# Patient Record
Sex: MALE | Race: BLACK OR AFRICAN AMERICAN
[De-identification: names, ages, dates, MRNs, and addresses within clinical notes are randomized per-mention and may not be internally consistent; named-entity substitution may affect disease eponyms.]

---

## 2017-09-07 ENCOUNTER — HOSPITAL ENCOUNTER (EMERGENCY)
Dept: HOSPITAL 63 - ER | Age: 72
Discharge: HOME | End: 2017-09-07
Payer: COMMERCIAL

## 2017-09-07 VITALS — SYSTOLIC BLOOD PRESSURE: 128 MMHG | DIASTOLIC BLOOD PRESSURE: 80 MMHG
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
## 2017-09-07 DIAGNOSIS — M62.830: ICD-10-CM

## 2017-09-07 DIAGNOSIS — I10: ICD-10-CM

## 2017-09-07 DIAGNOSIS — M25.70: ICD-10-CM
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
## 2017-09-07 DIAGNOSIS — S16.1XXA: Primary | ICD-10-CM

## 2017-09-07 DIAGNOSIS — M19.90: ICD-10-CM
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
## 2017-09-07 PROCEDURE — 99284 EMERGENCY DEPT VISIT MOD MDM: CPT

## 2017-09-07 PROCEDURE — 72125 CT NECK SPINE W/O DYE: CPT

## 2017-09-07 PROCEDURE — 72131 CT LUMBAR SPINE W/O DYE: CPT

## 2017-09-07 PROCEDURE — 96372 THER/PROPH/DIAG INJ SC/IM: CPT
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
## 2020-10-04 ENCOUNTER — HOSPITAL ENCOUNTER (EMERGENCY)
Dept: HOSPITAL 63 - ER | Age: 75
Discharge: HOME | End: 2020-10-04
Payer: COMMERCIAL

## 2020-10-04 VITALS
DIASTOLIC BLOOD PRESSURE: 77 MMHG | SYSTOLIC BLOOD PRESSURE: 158 MMHG | SYSTOLIC BLOOD PRESSURE: 158 MMHG | DIASTOLIC BLOOD PRESSURE: 77 MMHG

## 2020-10-04 VITALS — HEIGHT: 72 IN | BODY MASS INDEX: 21.5 KG/M2 | WEIGHT: 158.73 LBS
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
## 2020-10-04 DIAGNOSIS — S09.90XA: Primary | ICD-10-CM

## 2020-10-04 DIAGNOSIS — I10: ICD-10-CM
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
## 2020-10-04 DIAGNOSIS — M19.90: ICD-10-CM

## 2020-10-04 DIAGNOSIS — H11.31: ICD-10-CM

## 2020-10-04 DIAGNOSIS — M25.552: ICD-10-CM
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
## 2020-10-04 PROCEDURE — 99285 EMERGENCY DEPT VISIT HI MDM: CPT

## 2020-10-04 PROCEDURE — 73502 X-RAY EXAM HIP UNI 2-3 VIEWS: CPT

## 2020-10-04 PROCEDURE — 72125 CT NECK SPINE W/O DYE: CPT

## 2020-10-04 PROCEDURE — 70450 CT HEAD/BRAIN W/O DYE: CPT
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
## 2020-11-25 ENCOUNTER — HOSPITAL ENCOUNTER (OUTPATIENT)
Dept: HOSPITAL 63 - LAB | Age: 75
End: 2020-11-25
Attending: NURSE ANESTHETIST, CERTIFIED REGISTERED
Payer: COMMERCIAL

## 2020-11-25 DIAGNOSIS — Z20.828: ICD-10-CM

## 2020-11-25 DIAGNOSIS — H26.9: ICD-10-CM

## 2020-11-25 DIAGNOSIS — Z01.818: Primary | ICD-10-CM

## 2020-11-25 PROCEDURE — U0003 INFECTIOUS AGENT DETECTION BY NUCLEIC ACID (DNA OR RNA); SEVERE ACUTE RESPIRATORY SYNDROME CORONAVIRUS 2 (SARS-COV-2) (CORONAVIRUS DISEASE [COVID-19]), AMPLIFIED PROBE TECHNIQUE, MAKING USE OF HIGH THROUGHPUT TECHNOLOGIES AS DESCRIBED BY CMS-2020-01-R: HCPCS

## 2020-12-01 ENCOUNTER — HOSPITAL ENCOUNTER (OUTPATIENT)
Dept: HOSPITAL 63 - SURG | Age: 75
Discharge: HOME | End: 2020-12-01
Attending: OPHTHALMOLOGY
Payer: COMMERCIAL

## 2020-12-01 VITALS
SYSTOLIC BLOOD PRESSURE: 155 MMHG | DIASTOLIC BLOOD PRESSURE: 98 MMHG | SYSTOLIC BLOOD PRESSURE: 155 MMHG | DIASTOLIC BLOOD PRESSURE: 98 MMHG

## 2020-12-01 DIAGNOSIS — M06.9: ICD-10-CM

## 2020-12-01 DIAGNOSIS — F32.9: ICD-10-CM

## 2020-12-01 DIAGNOSIS — Z79.899: ICD-10-CM

## 2020-12-01 DIAGNOSIS — H11.31: ICD-10-CM

## 2020-12-01 DIAGNOSIS — J44.9: ICD-10-CM

## 2020-12-01 DIAGNOSIS — K21.9: ICD-10-CM

## 2020-12-01 DIAGNOSIS — H25.12: Primary | ICD-10-CM

## 2020-12-01 DIAGNOSIS — I10: ICD-10-CM

## 2020-12-01 DIAGNOSIS — H40.9: ICD-10-CM

## 2020-12-01 PROCEDURE — 66984 XCAPSL CTRC RMVL W/O ECP: CPT

## 2020-12-01 PROCEDURE — 65820 GONIOTOMY: CPT

## 2020-12-01 PROCEDURE — V2632 POST CHMBR INTRAOCULAR LENS: HCPCS

## 2020-12-01 RX ADMIN — MOXIFLOXACIN HYDROCHLORIDE SCH DROP: 5 SOLUTION/ DROPS OPHTHALMIC at 08:11

## 2020-12-01 RX ADMIN — MOXIFLOXACIN HYDROCHLORIDE SCH DROP: 5 SOLUTION/ DROPS OPHTHALMIC at 08:20

## 2020-12-01 RX ADMIN — MOXIFLOXACIN HYDROCHLORIDE SCH DROP: 5 SOLUTION/ DROPS OPHTHALMIC at 08:25

## 2020-12-01 NOTE — PDOC4
Phaco IOL/HDK/OS


Date of Procedure:


Dec 1, 2020





Preoperative Diagnosis:


1. Senile Cataract, Left Eye


2. Glaucoma, Left Eye





Postoperative Diagnosis:


1. Senile Cataract, Left Eye


2. Glaucoma, Left Eye





Anesthesia:


Local (Block) with monitored anesthesia care





Surgeon:


Stone Salazar D.O.





Procedure:


1. Left Phacoemulsification with Intraocular Lens Implant


2. HDK Goniotomy, Left Eye





Findings:


1. Senile Cataract 


2. Glaucoma





Indications:


Worsening vision interfering with patient's lifestyle with mild to moderate 

glaucoma on pressure lowering medication.





Narrative:


After discussing the risks, complications and alternatives, including but not 

limited to loss of vision, infection, bleeding, swelling, anesthetic reaction, 

capsule rupture with vitreous loss, etc., the patient was given a peribulbar 

block under mild IV sedation and cardiac monitoring. Pressure was applied to the

eye for approximately 10 minutes. The patient was transferred to the main 

operating room and prepped and draped in the usual sterile fashion and 

positioned under the microscope. A lid speculum was placed. A side port incision

was made. A 2.4 mm clear corneal incision was made.  Viscoelastic was injected 

into the eye.  A continuous tear capsulorrhexis was performed, then 

hydrodissection was accomplished with balanced salt solution. The 

phacoemulsification needle was placed in the eye and the nucleus was emulsified.

The remaining cortical material was removed with the irrigation and aspiration 

apparatus. The capsule was polished as needed. The posterior capsule was noted 

to be clean and intact. Viscoelastic was injected into the eye inflating the 

capsular bag. An intraocular lens was injected into the eye, unfolding as 

desired and was positioned in the capsular bag. Viscoelastic was injected onto 

the surface of the cornea. The patient's head was positioned 30 away from the 

surgeon and the microscope was tilted 30 toward the surgeon. The surgical go

niolens was placed on the corneal surface and the drainage angle was visualized.

The HDK blade was inserted into the anterior chamber and in a eloina and meet 

fashion the trabecular meshwork was unroofed for about 3 clock hours. A small 

amount of hemorrhage was noted but stopped with the viscoelastic tamponade. The 

microscope and patient's head were then returned to the typical positions.The 

viscoelastic was aspirated from the eye. The wound edges were hydrated with 

balanced salt solution and there were no leaks. Viscoelastic was injected over 

the limbal incisions. Antibiotic and steroid were placed on the eye. The lid 

speculum was removed, the eye patched shut and a Perez shield applied. There were 

no complications and the patient was taken to the PACU in good condition.











STONE SALAZAR DO         Dec 1, 2020 09:18

## 2020-12-11 ENCOUNTER — HOSPITAL ENCOUNTER (OUTPATIENT)
Dept: HOSPITAL 63 - LAB | Age: 75
End: 2020-12-11
Attending: NURSE ANESTHETIST, CERTIFIED REGISTERED
Payer: COMMERCIAL

## 2020-12-11 DIAGNOSIS — Z20.828: ICD-10-CM

## 2020-12-11 DIAGNOSIS — H26.8: ICD-10-CM

## 2020-12-11 DIAGNOSIS — Z01.812: Primary | ICD-10-CM

## 2020-12-11 PROCEDURE — C9803 HOPD COVID-19 SPEC COLLECT: HCPCS

## 2020-12-11 PROCEDURE — U0003 INFECTIOUS AGENT DETECTION BY NUCLEIC ACID (DNA OR RNA); SEVERE ACUTE RESPIRATORY SYNDROME CORONAVIRUS 2 (SARS-COV-2) (CORONAVIRUS DISEASE [COVID-19]), AMPLIFIED PROBE TECHNIQUE, MAKING USE OF HIGH THROUGHPUT TECHNOLOGIES AS DESCRIBED BY CMS-2020-01-R: HCPCS

## 2020-12-15 ENCOUNTER — HOSPITAL ENCOUNTER (OUTPATIENT)
Dept: HOSPITAL 63 - SURG | Age: 75
Discharge: HOME | End: 2020-12-15
Attending: OPHTHALMOLOGY
Payer: COMMERCIAL

## 2020-12-15 VITALS — SYSTOLIC BLOOD PRESSURE: 175 MMHG | DIASTOLIC BLOOD PRESSURE: 97 MMHG

## 2020-12-15 DIAGNOSIS — H40.10X2: ICD-10-CM

## 2020-12-15 DIAGNOSIS — K21.9: ICD-10-CM

## 2020-12-15 DIAGNOSIS — F32.9: ICD-10-CM

## 2020-12-15 DIAGNOSIS — Z98.890: ICD-10-CM

## 2020-12-15 DIAGNOSIS — H25.11: Primary | ICD-10-CM

## 2020-12-15 DIAGNOSIS — I10: ICD-10-CM

## 2020-12-15 DIAGNOSIS — Z79.899: ICD-10-CM

## 2020-12-15 DIAGNOSIS — J44.9: ICD-10-CM

## 2020-12-15 DIAGNOSIS — M19.90: ICD-10-CM

## 2020-12-15 PROCEDURE — 65820 GONIOTOMY: CPT

## 2020-12-15 PROCEDURE — 66984 XCAPSL CTRC RMVL W/O ECP: CPT

## 2020-12-15 PROCEDURE — V2632 POST CHMBR INTRAOCULAR LENS: HCPCS

## 2020-12-15 RX ADMIN — MOXIFLOXACIN HYDROCHLORIDE SCH DROP: 5 SOLUTION/ DROPS OPHTHALMIC at 07:26

## 2020-12-15 RX ADMIN — MOXIFLOXACIN HYDROCHLORIDE SCH DROP: 5 SOLUTION/ DROPS OPHTHALMIC at 07:35

## 2020-12-15 RX ADMIN — MOXIFLOXACIN HYDROCHLORIDE SCH DROP: 5 SOLUTION/ DROPS OPHTHALMIC at 07:36

## 2020-12-15 NOTE — PDOC4
Phaco IOL/HDK/OD


Date of Procedure:


Dec 15, 2020





Preoperative Diagnosis:


1. Senile Cataract, Right Eye


2, Glaucoma, open angle, moderate stage, Right Eye





Postoperative Diagnosis:


1. Senile Cataract, Right Eye


2. Glaucoma, moderate stage, Right Eye





Anesthesia:


Local (Block) with monitored anesthesia care





Surgeon:


Stone Salazar D.O.





Procedure:


1. Right Phacoemulsification with Intraocular Lens Implant


2. HDK Goniotomy, Right Eye





Findings:


1. Senile Cataract 


2. Glaucoma, moderate stage, right eye.





Indications:


Worsening vision interfering with patient's lifestyle with mild to moderate 

glaucoma on pressure lowering medication.





Narrative:


After discussing the risks, complications and alternatives, including but not 

limited to loss of vision, infection, bleeding, swelling, anesthetic reaction, 

capsule rupture with vitreous loss, etc., the patient was given a peribulbar 

block under mild IV sedation and cardiac monitoring. Pressure was applied to the

eye for approximately 10 minutes. The patient was transferred to the main 

operating room and prepped and draped in the usual sterile fashion and 

positioned under the microscope. A lid speculum was placed. A side port incision

was made. A 2.4 mm clear corneal incision was made. Viscoelastic was injected 

into the eye.A continuous tear capsulorrhexis was performed, then 

hydrodissection was accomplished with balanced salt solution. The 

phacoemulsification needle was placed in the eye and the nucleus was emulsified.

The remaining cortical material was removed with the irrigation and aspiration 

apparatus. The capsule was polished as needed. The posterior capsule was noted 

to be clean and intact. Viscoelastic was injected into the eye inflating the 

capsular bag. An intraocular lens was injected into the eye, unfolding as 

desired and was positioned in the capsular bag.  Viscoelastic was injected onto 

the surface of the cornea. The patient's head was positioned 30 away from the 

surgeon and the microscope was tilted 30 toward the surgeon. The surgical 

goniolens was placed on the corneal surface and the drainage angle was 

visualized. The HDK blade was inserted into the anterior chamber and in a eloina 

and meet fashion the trabecular meshwork was unroofed for about 3 clock hours. A

small amount of hemorrhage was noted but stopped with the viscoelastic tampona

de. The microscope and patient's head were then returned to the typical 

positions. The viscoelastic was aspirated from the eye. The wound edges were 

hydrated with balanced salt solution, but wound leakage was noted. A single 10-0

nylon suture was placed, rotating the knot. There was no leakage noted at this 

point.  Viscoelastic was injected over the limbal incisions. Antibiotic and 

steroid were placed on the eye. The lid speculum was removed, the eye patched 

shut and a Perez shield applied. There were no complications and the patient was 

taken to the PACU in good condition.











STONE SALAZAR DO        Dec 15, 2020 08:53